# Patient Record
Sex: MALE | Race: WHITE
[De-identification: names, ages, dates, MRNs, and addresses within clinical notes are randomized per-mention and may not be internally consistent; named-entity substitution may affect disease eponyms.]

---

## 2020-09-15 ENCOUNTER — HOSPITAL ENCOUNTER (EMERGENCY)
Dept: HOSPITAL 50 - VM.ED | Age: 70
Discharge: HOME | End: 2020-09-15
Payer: OTHER GOVERNMENT

## 2020-09-15 DIAGNOSIS — Z88.8: ICD-10-CM

## 2020-09-15 DIAGNOSIS — Z79.82: ICD-10-CM

## 2020-09-15 DIAGNOSIS — Z88.5: ICD-10-CM

## 2020-09-15 DIAGNOSIS — F02.80: ICD-10-CM

## 2020-09-15 DIAGNOSIS — Z20.828: ICD-10-CM

## 2020-09-15 DIAGNOSIS — E86.0: ICD-10-CM

## 2020-09-15 DIAGNOSIS — G31.83: Primary | ICD-10-CM

## 2020-09-15 LAB
ANION GAP SERPL CALC-SCNC: 13.8 MMOL/L (ref 10–20)
CHLORIDE SERPL-SCNC: 101 MMOL/L (ref 98–107)
SODIUM SERPL-SCNC: 139 MMOL/L (ref 136–145)

## 2020-09-15 PROCEDURE — U0002 COVID-19 LAB TEST NON-CDC: HCPCS

## 2020-09-15 NOTE — CR
______________________________________________________________________________   

  

[id number] RAD/RAD Chest PA or AP 1V  

EXAM: FRONTAL CHEST  

   

 INDICATION: WHEEZING.  

   

 COMPARISON: None.  

   

 DISCUSSION: Possible underlying emphysema. No acute infiltrates. Borderline  

 heart size without evidence of heart failure.  

   

 IMPRESSION:    

 1.  No acute findings.  

   

 Electronically signed by Christian Wells MD on 9/15/2020 2:18 PM  

   

  

Christian Wells MD                 

 09/15/20 9106    

  

Thank you for allowing us to participate in the care of your patient.

## 2020-09-15 NOTE — EDM.PDOC
ED HPI GENERAL MEDICAL PROBLEM





- General


Chief Complaint: General


Stated Complaint: ER


Time Seen by Provider: 09/15/20 13:32


Source of Information: Reports: Family





- History of Present Illness


INITIAL COMMENTS - FREE TEXT/NARRATIVE: 





Norris is a 71 y/o male who is brought to the ER by his wife today for 

increased tremors and overall weakness. He had his home health visit today and 

they nurse thought he seemed to have tremors all over his body, which is unusual

for him. He has Lewy Body Dementia and had been cared for exclusively at home by

his wife with Home Health assistance. He didn't really want to get out of bed 

today and he seemed overall more weak. He had his bath today and the aide 

thought he seemed to be ill so his wife brought him to the ER.


[facility] [place]. He sees neurology 

in [place].








- Related Data


                                    Allergies











Allergy/AdvReac Type Severity Reaction Status Date / Time


 


gemfibrozil Allergy  Swelling Verified 09/15/20 13:34


 


morphine Allergy  Cannot Verified 09/15/20 13:34





   Remember  











Home Meds: 


                                    Home Meds





Acetaminophen [Tylenol] 325 mg PO ASDIRECTED 09/15/20 [History]


Albuterol Sulfate [Proair Digihaler] 90 mcg IH QID 09/15/20 [History]


Aspirin 81 mg PO DAILY 09/15/20 [History]


Budesonide/Formoterol Fumarate [Budesonide-Formoterol 160-4.5] 10.2 gm IH BID 

09/15/20 [History]


DULoxetine [Cymbalta] 60 mg PO DAILY 09/15/20 [History]


Donepezil HCl 10 mg PO BEDTIME 09/15/20 [History]


Finasteride 5 mg PO DAILY 09/15/20 [History]


Fluticasone Propionate [Flovent Diskus] 50 mcg IH DAILY 09/15/20 [History]


Meloxicam 15 mg PO DAILY 09/15/20 [History]


Metoprolol Tartrate [Lopressor] 100 mg PO BID 09/15/20 [History]


Multivitamin [Multivitamins] 1 cap PO DAILY 09/15/20 [History]


Simvastatin [Zocor] 80 mg PO BEDTIME 09/15/20 [History]


Tamsulosin [Flomax] 0.4 mg PO DAILY 09/15/20 [History]


hydroCHLOROthiazide [Hydrochlorothiazide] 25 mg PO DAILY 09/15/20 [History]











Past Medical History


Neurological History: Reports: Other (See Below) (Lewy Body Dementia)





ED ROS GENERAL





- Review of Systems


Review Of Systems: See Below





ED EXAM, GENERAL





- Physical Exam


Exam: See Below


General Appearance: Alert, WD/WN, No Apparent Distress (Elderly male)


Eye Exam: Bilateral Eye: PERRL


Ears: Normal External Exam, Normal Canal, Hearing Grossly Normal, Normal TMs


Nose: Normal Inspection, Normal Mucosa


Throat/Mouth: Normal Oropharynx, No Airway Compromise, Other (Lips slightly dry)


Head: Atraumatic, Normocephalic


Neck: Normal Inspection, Supple, Non-Tender


Respiratory/Chest: Wheezing (faint wheezing throughout)


Cardiovascular: Regular Rate, Rhythm, Systolic Murmur


GI/Abdominal: Normal Bowel Sounds, Soft, Non-Tender


 (Male) Exam: Deferred


Rectal (Males) Exam: Deferred


Back Exam: Normal Inspection


Extremities: Normal Inspection, Normal Capillary Refill


Neurological: Alert, CN II-XII Intact, Slow to Respond


Skin Exam: Warm, Dry, Intact, Normal Color


Lymphatic: No Adenopathy





Course





- Vital Signs


Text/Narrative:: 





1332 The patient was seen on arrival to the ER. Labs ordered.





1445 Labs reviewed. CBC neg, COVID neg, and note BUN=28. Crt normal, but patient

has not voided yet. Will given a liter of NS over a couple hours and hopefully 

be able to check a UA when he can void. NS 1000cc over 2 hours ordered. 


Patient also had mild wheezing and had not yet had his daily inhalers due to 

weakness, albuterol neb ordered.





1600 Breathing easier following neb, wheezing gone now. 





1700 Still not voided, second liter of NS ordered.





1810 Patient able to void after 1/2 second liter NS infused. No further 

excessive tremors and patient now more talkative and looks better. Conversation 

more coherent. Patient and his wife given discharge instructions and will send 

the patient home. 





He left the ER in stable condition.





Last Recorded V/S: 


                                Last Vital Signs











Temp  36.2 C   09/15/20 13:34


 


Pulse  53 L  09/15/20 16:46


 


Resp  12   09/15/20 16:46


 


BP  143/64 H  09/15/20 16:46


 


Pulse Ox  92 L  09/15/20 16:46














- Orders/Labs/Meds


Orders: 


                               Active Orders 24 hr











 Category Date Time Status


 


 RT Aerosol Therapy [RC] ASDIRECTED Care  09/15/20 14:54 Active


 


 Sodium Chloride 0.9% [Saline Flush] Med  09/15/20 13:40 Active





 10 ml FLUSH ASDIRECTED PRN   


 


 Saline Lock Insert [OM.PC] Stat Oth  09/15/20 13:40 Ordered








                                Medication Orders





Sodium Chloride (Saline Flush)  10 ml FLUSH ASDIRECTED PRN


   PRN Reason: Keep Vein Open








Labs: 


                                Laboratory Tests











  09/15/20 09/15/20 09/15/20 Range/Units





  13:50 14:08 14:08 


 


WBC   8.5   (4.0-10.0)  x10^3/uL


 


RBC   5.38   (4.5-6.0)  x10^6/uL


 


Hgb   16.5   (14.0-18.0)  g/dL


 


Hct   48.6   (40.0-52.0)  %


 


MCV   90.3   (78.0-93.0)  fL


 


MCH   30.7   (26.0-32.0)  pg


 


MCHC   34.0   (32.0-36.0)  g/dL


 


RDW Coeff of Michele   13.3   (10.0-15.0)  %


 


Plt Count   170   (130-400)  x10^3/uL


 


Neut % (Auto)   63.7   (50.0-80.0)  %


 


Lymph % (Auto)   22.9 L   (25.0-50.0)  %


 


Mono % (Auto)   10.0   (2.0-11.0)  %


 


Eos % (Auto)   3.0   (0.0-4.0)  %


 


Baso % (Auto)   0.4   (0.2-1.2)  %


 


Sodium    139  (136-145)  mmol/L


 


Potassium    3.8  (3.5-5.1)  mmol/L


 


Chloride    101  ()  mmol/L


 


Carbon Dioxide    28  (21-32)  mmol/L


 


Anion Gap    13.8  (10-20)  mmol/L


 


BUN    28 H  (7-18)  mg/dL


 


Creatinine    1.3  (0.70-1.30)  mg/dL


 


Est Cr Clr Drug Dosing    54.71  mL/min


 


Estimated GFR (MDRD)    54  


 


Glucose    132 H  ()  mg/dL


 


Calcium    9.3  (8.5-10.1)  mg/dL


 


Corrected Calcium    9.62  (8.5-10.1)  mg/dL


 


Total Bilirubin    0.6  (0.2-1.0)  mg/dL


 


AST    20  (15-37)  U/L


 


ALT    34  (16-63)  U/L


 


Alkaline Phosphatase    71  ()  U/L


 


Total Protein    7.5  (6.4-8.2)  g/dL


 


Albumin    3.6  (3.4-5.0)  g/dL


 


Globulin    3.9  


 


Albumin/Globulin Ratio    0.92  


 


Urine Color     (YELLOW)  


 


Urine Appearance     (CLEAR)  


 


Urine pH     (5.0-8.0)  


 


Ur Specific Gravity     


 


Urine Protein     (NEGATIVE)  mg/dL


 


Urine Glucose (UA)     (NEGATIVE)  mg/dL


 


Urine Ketones     (NEGATIVE)  mg/dL


 


Urine Occult Blood     (NEGATIVE)  


 


Urine Nitrite     (NEGATIVE)  


 


Urine Bilirubin     (NEGATIVE)  


 


Urine Urobilinogen     (0.2)  EU/dL


 


Ur Leukocyte Esterase     (NEGATIVE)  


 


SARS CoV-2 RNA Rapid ALEKS  Negative    (NEGATIVE)  














  09/15/20 Range/Units





  18:04 


 


WBC   (4.0-10.0)  x10^3/uL


 


RBC   (4.5-6.0)  x10^6/uL


 


Hgb   (14.0-18.0)  g/dL


 


Hct   (40.0-52.0)  %


 


MCV   (78.0-93.0)  fL


 


MCH   (26.0-32.0)  pg


 


MCHC   (32.0-36.0)  g/dL


 


RDW Coeff of Michele   (10.0-15.0)  %


 


Plt Count   (130-400)  x10^3/uL


 


Neut % (Auto)   (50.0-80.0)  %


 


Lymph % (Auto)   (25.0-50.0)  %


 


Mono % (Auto)   (2.0-11.0)  %


 


Eos % (Auto)   (0.0-4.0)  %


 


Baso % (Auto)   (0.2-1.2)  %


 


Sodium   (136-145)  mmol/L


 


Potassium   (3.5-5.1)  mmol/L


 


Chloride   ()  mmol/L


 


Carbon Dioxide   (21-32)  mmol/L


 


Anion Gap   (10-20)  mmol/L


 


BUN   (7-18)  mg/dL


 


Creatinine   (0.70-1.30)  mg/dL


 


Est Cr Clr Drug Dosing   mL/min


 


Estimated GFR (MDRD)   


 


Glucose   ()  mg/dL


 


Calcium   (8.5-10.1)  mg/dL


 


Corrected Calcium   (8.5-10.1)  mg/dL


 


Total Bilirubin   (0.2-1.0)  mg/dL


 


AST   (15-37)  U/L


 


ALT   (16-63)  U/L


 


Alkaline Phosphatase   ()  U/L


 


Total Protein   (6.4-8.2)  g/dL


 


Albumin   (3.4-5.0)  g/dL


 


Globulin   


 


Albumin/Globulin Ratio   


 


Urine Color  Dark yellow H  (YELLOW)  


 


Urine Appearance  Slightly cloudy H  (CLEAR)  


 


Urine pH  5.5  (5.0-8.0)  


 


Ur Specific Gravity  >=1.030  


 


Urine Protein  Negative  (NEGATIVE)  mg/dL


 


Urine Glucose (UA)  Negative  (NEGATIVE)  mg/dL


 


Urine Ketones  Negative  (NEGATIVE)  mg/dL


 


Urine Occult Blood  Negative  (NEGATIVE)  


 


Urine Nitrite  Negative  (NEGATIVE)  


 


Urine Bilirubin  Negative  (NEGATIVE)  


 


Urine Urobilinogen  0.2  (0.2)  EU/dL


 


Ur Leukocyte Esterase  Negative  (NEGATIVE)  


 


SARS CoV-2 RNA Rapid ALEKS   (NEGATIVE)  











Meds: 


Medications











Generic Name Dose Route Start Last Admin





  Trade Name Freq  PRN Reason Stop Dose Admin


 


Sodium Chloride  10 ml  09/15/20 13:40 





  Saline Flush  FLUSH  





  ASDIRECTED PRN  





  Keep Vein Open  














Discontinued Medications














Generic Name Dose Route Start Last Admin





  Trade Name Freq  PRN Reason Stop Dose Admin


 


Albuterol  2.5 mg  09/15/20 14:54  09/15/20 15:05





  Proventil Neb Soln  NEB  09/15/20 14:55  2.5 mg





  ONETIME ONE   Administration


 


Sodium Chloride  1,000 mls @ 500 mls/hr  09/15/20 14:52  09/15/20 15:01





  Normal Saline  IV  09/15/20 16:51  500 mls/hr





  ONETIME ONE   Administration














- Radiology Interpretation


Free Text/Narrative:: 





XR Chest 1V=no acute findings





Departure





- Departure


Time of Disposition: 18:20


Disposition: Home, Self-Care 01


Condition: Good


Clinical Impression: 


 Dehydration, Lewy body dementia








- Discharge Information


*PRESCRIPTION DRUG MONITORING PROGRAM REVIEWED*: Not Applicable


*COPY OF PRESCRIPTION DRUG MONITORING REPORT IN PATIENT JESSY: Not Applicable


Instructions:  Dehydration, Adult, Easy-to-Read, Lewy Body Dementia


Referrals: 


PCP,None [Primary Care Provider] - 


Forms:  ED Department Discharge





Sepsis Event Note (ED)





- Evaluation


Sepsis Screening Result: No Definite Risk





- Focused Exam


Vital Signs: 


                                   Vital Signs











  Temp Pulse Resp BP Pulse Ox


 


 09/15/20 16:46   53 L  12  143/64 H  92 L


 


 09/15/20 14:49   55 L  21 H  136/69  92 L


 


 09/15/20 13:34  36.2 C  53 L  24 H  148/54 H  94 L














- My Orders


Last 24 Hours: 


My Active Orders





09/15/20 13:40


Sodium Chloride 0.9% [Saline Flush]   10 ml FLUSH ASDIRECTED PRN 


Saline Lock Insert [OM.PC] Stat 





09/15/20 14:54


RT Aerosol Therapy [RC] ASDIRECTED 














- Assessment/Plan


Last 24 Hours: 


My Active Orders





09/15/20 13:40


Sodium Chloride 0.9% [Saline Flush]   10 ml FLUSH ASDIRECTED PRN 


Saline Lock Insert [OM.PC] Stat 





09/15/20 14:54


RT Aerosol Therapy [RC] ASDIRECTED 











Assessment:: 





1)Dehydration


2)Hx Lewy Body Dementia





Plan: 





-Resume home medications as prescribed by PCP at the [facility]


-Continue all cares at home as previously ordered


-Push oral fluids


-Keep your upcoming Neurology appt in 2 weeks or see PCP sooner if any concerns


-Return to the ER if any other concerns or new symptoms

## 2020-12-10 ENCOUNTER — HOSPITAL ENCOUNTER (EMERGENCY)
Dept: HOSPITAL 50 - VM.ED | Age: 70
LOS: 1 days | Discharge: ANOTHER HOSPITAL | End: 2020-12-11
Payer: OTHER GOVERNMENT

## 2020-12-10 DIAGNOSIS — R79.89: ICD-10-CM

## 2020-12-10 DIAGNOSIS — Z79.899: ICD-10-CM

## 2020-12-10 DIAGNOSIS — Z88.5: ICD-10-CM

## 2020-12-10 DIAGNOSIS — Z79.82: ICD-10-CM

## 2020-12-10 DIAGNOSIS — J44.1: ICD-10-CM

## 2020-12-10 DIAGNOSIS — F02.80: ICD-10-CM

## 2020-12-10 DIAGNOSIS — U07.1: Primary | ICD-10-CM

## 2020-12-10 DIAGNOSIS — R09.02: ICD-10-CM

## 2020-12-10 DIAGNOSIS — Z88.8: ICD-10-CM

## 2020-12-10 DIAGNOSIS — G31.83: ICD-10-CM

## 2020-12-10 LAB
CHLORIDE SERPL-SCNC: 101 MMOL/L (ref 98–107)
SODIUM SERPL-SCNC: 138 MMOL/L (ref 136–145)

## 2020-12-10 PROCEDURE — 82728 ASSAY OF FERRITIN: CPT

## 2020-12-10 PROCEDURE — 87635 SARS-COV-2 COVID-19 AMP PRB: CPT

## 2020-12-10 PROCEDURE — 83880 ASSAY OF NATRIURETIC PEPTIDE: CPT

## 2020-12-10 PROCEDURE — 86140 C-REACTIVE PROTEIN: CPT

## 2020-12-10 PROCEDURE — 84145 PROCALCITONIN (PCT): CPT

## 2020-12-10 PROCEDURE — 93005 ELECTROCARDIOGRAM TRACING: CPT

## 2020-12-10 PROCEDURE — 85379 FIBRIN DEGRADATION QUANT: CPT

## 2020-12-10 PROCEDURE — 84484 ASSAY OF TROPONIN QUANT: CPT

## 2020-12-10 PROCEDURE — 96365 THER/PROPH/DIAG IV INF INIT: CPT

## 2020-12-10 PROCEDURE — 80053 COMPREHEN METABOLIC PANEL: CPT

## 2020-12-10 PROCEDURE — 87040 BLOOD CULTURE FOR BACTERIA: CPT

## 2020-12-10 PROCEDURE — 83615 LACTATE (LD) (LDH) ENZYME: CPT

## 2020-12-10 PROCEDURE — U0002 COVID-19 LAB TEST NON-CDC: HCPCS

## 2020-12-10 PROCEDURE — 71045 X-RAY EXAM CHEST 1 VIEW: CPT

## 2020-12-10 PROCEDURE — 36415 COLL VENOUS BLD VENIPUNCTURE: CPT

## 2020-12-10 PROCEDURE — 96375 TX/PRO/DX INJ NEW DRUG ADDON: CPT

## 2020-12-10 PROCEDURE — 85025 COMPLETE CBC W/AUTO DIFF WBC: CPT

## 2020-12-10 PROCEDURE — 83605 ASSAY OF LACTIC ACID: CPT

## 2020-12-10 PROCEDURE — 99285 EMERGENCY DEPT VISIT HI MDM: CPT

## 2020-12-10 NOTE — EDM.PDOC
ED HPI GENERAL MEDICAL PROBLEM





- General


Chief Complaint: Respiratory Problem


Stated Complaint: Increased SOB, lethargy, hypoxia


Time Seen by Provider: 12/10/20 21:45


Source of Information: Reports: Patient, Family


History Limitations: Reports: Altered Mental Status





- History of Present Illness


INITIAL COMMENTS - FREE TEXT/NARRATIVE: 





Patient was brought to the emergency department today by his daughter who is 

actually a nurse in the emergency department.  This patient has a history of 

Lewy bodies dementia with Parkinson's.  He was exposed to a grandson was Covid 

positive about a week ago.  For the past about 4 to 5 days the primary caregiver

of the wife of this patient has noticed that he has not been self.  He does have

a history of COPD.  She notes that he has a little bit more shortness of breath 

coughing wheezing.  He has not been as active.  He has not been drinking as much

fluids or eating much for food.  Most of the HPI is obtained from the daughter 

who is also primary caregiver and the wife on the following due to the patient's

dementia.  They noted at home today with oxygen saturation about 86% on room 

air.  He is not typically on the oxygen for his limitation at home.  He has been

using for more of his nebulizers without much improvement of his shortness of 

breath.  He identified a fever today of 101.2.  With the recent Covid exposure 

and his worsening status he was brought to the emergency department for further 

evaluation.  He has not been vomiting.  He has no diarrhea.  The patient does 

not offer any complaints at this time.





- Related Data


                                    Allergies











Allergy/AdvReac Type Severity Reaction Status Date / Time


 


gemfibrozil Allergy  Swelling Verified 12/10/20 23:38


 


morphine Allergy  Cannot Verified 12/10/20 23:38





   Remember  











Home Meds: 


                                    Home Meds





Acetaminophen [Tylenol] 325 mg PO ASDIRECTED 09/15/20 [History]


Albuterol Sulfate [Proair Digihaler] 90 mcg IH QID 09/15/20 [History]


Aspirin 81 mg PO DAILY 09/15/20 [History]


Budesonide/Formoterol Fumarate [Budesonide-Formoterol 160-4.5] 10.2 gm IH BID 

09/15/20 [History]


DULoxetine [Cymbalta] 60 mg PO DAILY 09/15/20 [History]


Donepezil HCl 10 mg PO BEDTIME 09/15/20 [History]


Finasteride 5 mg PO DAILY 09/15/20 [History]


Fluticasone Propionate [Flovent Diskus] 50 mcg IH DAILY 09/15/20 [History]


Meloxicam 15 mg PO DAILY 09/15/20 [History]


Metoprolol Tartrate [Lopressor] 100 mg PO BID 09/15/20 [History]


Multivitamin [Multivitamins] 1 cap PO DAILY 09/15/20 [History]


Simvastatin [Zocor] 80 mg PO BEDTIME 09/15/20 [History]


Tamsulosin [Flomax] 0.4 mg PO DAILY 09/15/20 [History]


hydroCHLOROthiazide [Hydrochlorothiazide] 25 mg PO DAILY 09/15/20 [History]











Past Medical History


Neurological History: Reports: Other (See Below) (Lewy Body Dementia)


Other Neuro History: Lewy Body Dementia





- Infectious Disease History


Infectious Disease History: Reports: Novel Coronavirus





ED ROS GENERAL





- Review of Systems


Review Of Systems: Unable To Obtain


Reason Not Obtained: Patient with dementia.





ED EXAM, GENERAL





- Physical Exam


Exam: See Below


Free Text/Narrative:: 





He is alert and interactive.  He is mildly tachypneic and mildly short of breath

 on exam.  He is able to speak about 3-5 word sentences.  He has some audible 

wheezing.  He gets very short of breath with any physical exertion.  There is no

 severe distress.  No labored breathing.


Exam Limited By: Altered Mental Status


General Appearance: Alert, WD/WN


Eye Exam: Bilateral Eye: EOMI, PERRL


Ears: Normal External Exam


Nose: Normal Inspection


Throat/Mouth: Normal Inspection


Head: Atraumatic, Normocephalic


Neck: Normal Inspection, Supple


Respiratory/Chest: No Respiratory Distress, Decreased Breath Sounds, Wheezing 

(Inspiratory expiratory wheezing bilaterally.).  No: Crackles, Rales, Rhonchi, 

Stridor, Accessory Muscle Use, Retractions


Cardiovascular: Normal Peripheral Pulses, Regular Rate, Rhythm


Peripheral Pulses: 2+: Radial (L), Radial (R), Posterior Tibial (L), Posterior 

Tibial (R), Dorsalis Pedis (L), Dorsalis Pedis (R)


GI/Abdominal: Normal Bowel Sounds, Soft, Non-Tender, Pelvis Stable


 (Male) Exam: Deferred


Rectal (Males) Exam: Deferred


Back Exam: Normal Inspection, Full Range of Motion


Extremities: Normal Inspection, Normal Range of Motion, Pedal Edema (1+ 

minimally pigmented edema bilaterally around the ankles)


Neurological: Alert, Normal Gait, No Motor/Sensory Deficits, Confused (Although 

this is his baseline)


Psychiatric: Flat Affect


Skin Exam: Intact, Cool, Diaphoretic, Pallor


Lymphatic: No Adenopathy


  ** #1 Interpretation


EKG Date: 12/10/20


Time: 22:45


Rhythm: NSR


Rate (Beats/Min): 84


Axis: Normal


P-Wave: Present


QRS: Other (Intraventricular conduction delay)


ST-T: Normal


QT: Normal





Course





- Vital Signs


Last Recorded V/S: 


                                Last Vital Signs











Temp  95.4 F L  12/10/20 21:43


 


Pulse  81   12/10/20 23:58


 


Resp  24 H  12/10/20 23:58


 


BP  124/80   12/10/20 23:58


 


Pulse Ox  94 L  12/10/20 23:58














- Orders/Labs/Meds


Labs: 


                                Laboratory Tests











  12/10/20 12/10/20 12/10/20 Range/Units





  21:50 22:25 22:25 


 


WBC   4.5   (4.0-10.0)  x10^3/uL


 


RBC   5.07   (4.5-6.0)  x10^6/uL


 


Hgb   15.4   (14.0-18.0)  g/dL


 


Hct   45.8   (40.0-52.0)  %


 


MCV   90.3   (78.0-93.0)  fL


 


MCH   30.4   (26.0-32.0)  pg


 


MCHC   33.6   (32.0-36.0)  g/dL


 


RDW Coeff of Michele   14.3   (10.0-15.0)  %


 


Plt Count   105 L   (130-400)  x10^3/uL


 


Neut % (Auto)   54.6   (50.0-80.0)  %


 


Lymph % (Auto)   28.9   (25.0-50.0)  %


 


Mono % (Auto)   15.6 H   (2.0-11.0)  %


 


Eos % (Auto)   0.7   (0.0-4.0)  %


 


Baso % (Auto)   0.2   (0.2-1.2)  %


 


D-Dimer, Quantitative     (<=0.58)  mg/LFEU


 


Sodium    138  (136-145)  mmol/L


 


Potassium    3.9  (3.5-5.1)  mmol/L


 


Chloride    101  ()  mmol/L


 


Carbon Dioxide    29  (21-32)  mmol/L


 


Anion Gap    11.9  (10-20)  mmol/L


 


BUN    18  (7-18)  mg/dL


 


Creatinine    1.3  (0.70-1.30)  mg/dL


 


Est Cr Clr Drug Dosing    TNP  


 


Estimated GFR (MDRD)    54  


 


Glucose    119 H  ()  mg/dL


 


Lactic Acid     (0.4-2.0)  mmol/L


 


Calcium    8.2 L  (8.5-10.1)  mg/dL


 


Corrected Calcium    8.60  (8.5-10.1)  mg/dL


 


Ferritin     ()  ng/mL


 


Total Bilirubin    0.6  (0.2-1.0)  mg/dL


 


AST    30  (15-37)  U/L


 


ALT    42  (16-63)  U/L


 


Alkaline Phosphatase    72  ()  U/L


 


Lactate Dehydrogenase     ()  U/L


 


Troponin I    0.485 H*  (<=0.056)  ng/mL


 


C-Reactive Protein    2.2 H  (<=0.9)  mg/dL


 


NT-Pro-B Natriuret Pep    456 H  (<=125)  pg/mL


 


Total Protein    7.6  (6.4-8.2)  g/dL


 


Albumin    3.5  (3.4-5.0)  g/dL


 


Globulin    4.1  


 


Albumin/Globulin Ratio    0.85  


 


Procalcitonin     ng/mL


 


SARS CoV-2 RNA Rapid ALEKS  Positive H    (NEGATIVE)  














  12/10/20 12/10/20 12/10/20 Range/Units





  22:25 22:25 22:25 


 


WBC     (4.0-10.0)  x10^3/uL


 


RBC     (4.5-6.0)  x10^6/uL


 


Hgb     (14.0-18.0)  g/dL


 


Hct     (40.0-52.0)  %


 


MCV     (78.0-93.0)  fL


 


MCH     (26.0-32.0)  pg


 


MCHC     (32.0-36.0)  g/dL


 


RDW Coeff of Michele     (10.0-15.0)  %


 


Plt Count     (130-400)  x10^3/uL


 


Neut % (Auto)     (50.0-80.0)  %


 


Lymph % (Auto)     (25.0-50.0)  %


 


Mono % (Auto)     (2.0-11.0)  %


 


Eos % (Auto)     (0.0-4.0)  %


 


Baso % (Auto)     (0.2-1.2)  %


 


D-Dimer, Quantitative   0.39   (<=0.58)  mg/LFEU


 


Sodium     (136-145)  mmol/L


 


Potassium     (3.5-5.1)  mmol/L


 


Chloride     ()  mmol/L


 


Carbon Dioxide     (21-32)  mmol/L


 


Anion Gap     (10-20)  mmol/L


 


BUN     (7-18)  mg/dL


 


Creatinine     (0.70-1.30)  mg/dL


 


Est Cr Clr Drug Dosing     


 


Estimated GFR (MDRD)     


 


Glucose     ()  mg/dL


 


Lactic Acid  1.2    (0.4-2.0)  mmol/L


 


Calcium     (8.5-10.1)  mg/dL


 


Corrected Calcium     (8.5-10.1)  mg/dL


 


Ferritin     ()  ng/mL


 


Total Bilirubin     (0.2-1.0)  mg/dL


 


AST     (15-37)  U/L


 


ALT     (16-63)  U/L


 


Alkaline Phosphatase     ()  U/L


 


Lactate Dehydrogenase    181  ()  U/L


 


Troponin I     (<=0.056)  ng/mL


 


C-Reactive Protein     (<=0.9)  mg/dL


 


NT-Pro-B Natriuret Pep     (<=125)  pg/mL


 


Total Protein     (6.4-8.2)  g/dL


 


Albumin     (3.4-5.0)  g/dL


 


Globulin     


 


Albumin/Globulin Ratio     


 


Procalcitonin     ng/mL


 


SARS CoV-2 RNA Rapid ALEKS     (NEGATIVE)  














  12/10/20 12/10/20 Range/Units





  22:25 22:25 


 


WBC    (4.0-10.0)  x10^3/uL


 


RBC    (4.5-6.0)  x10^6/uL


 


Hgb    (14.0-18.0)  g/dL


 


Hct    (40.0-52.0)  %


 


MCV    (78.0-93.0)  fL


 


MCH    (26.0-32.0)  pg


 


MCHC    (32.0-36.0)  g/dL


 


RDW Coeff of Michele    (10.0-15.0)  %


 


Plt Count    (130-400)  x10^3/uL


 


Neut % (Auto)    (50.0-80.0)  %


 


Lymph % (Auto)    (25.0-50.0)  %


 


Mono % (Auto)    (2.0-11.0)  %


 


Eos % (Auto)    (0.0-4.0)  %


 


Baso % (Auto)    (0.2-1.2)  %


 


D-Dimer, Quantitative    (<=0.58)  mg/LFEU


 


Sodium    (136-145)  mmol/L


 


Potassium    (3.5-5.1)  mmol/L


 


Chloride    ()  mmol/L


 


Carbon Dioxide    (21-32)  mmol/L


 


Anion Gap    (10-20)  mmol/L


 


BUN    (7-18)  mg/dL


 


Creatinine    (0.70-1.30)  mg/dL


 


Est Cr Clr Drug Dosing    


 


Estimated GFR (MDRD)    


 


Glucose    ()  mg/dL


 


Lactic Acid    (0.4-2.0)  mmol/L


 


Calcium    (8.5-10.1)  mg/dL


 


Corrected Calcium    (8.5-10.1)  mg/dL


 


Ferritin  534 H   ()  ng/mL


 


Total Bilirubin    (0.2-1.0)  mg/dL


 


AST    (15-37)  U/L


 


ALT    (16-63)  U/L


 


Alkaline Phosphatase    ()  U/L


 


Lactate Dehydrogenase    ()  U/L


 


Troponin I    (<=0.056)  ng/mL


 


C-Reactive Protein    (<=0.9)  mg/dL


 


NT-Pro-B Natriuret Pep    (<=125)  pg/mL


 


Total Protein    (6.4-8.2)  g/dL


 


Albumin    (3.4-5.0)  g/dL


 


Globulin    


 


Albumin/Globulin Ratio    


 


Procalcitonin   0.08  ng/mL


 


SARS CoV-2 RNA Rapid ALEKS    (NEGATIVE)  











Meds: 


Medications














Discontinued Medications














Generic Name Dose Route Start Last Admin





  Trade Name Freq  PRN Reason Stop Dose Admin


 


Albuterol  2.5 mg  12/10/20 22:09  12/10/20 22:27





  Proventil Neb Soln  [facility]  12/10/20 22:10  2.5 mg





  STAT STA   Administration


 


Albuterol/Ipratropium  3 ml  12/10/20 22:03 





  Duoneb 3.0-0.5 Mg/3 Ml  [facility]  12/10/20 22:04 





  ONETIME ONE  


 


Aspirin  324 mg  12/11/20 00:09  12/11/20 00:13





  Aspirin  PO  12/11/20 00:10  324 mg





  ONETIME ONE   Administration


 


Azithromycin  500 mg  12/10/20 22:03  12/10/20 22:38





  Zithromax  PO  12/10/20 22:04  500 mg





  ONETIME ONE   Administration


 


Ceftriaxone Sodium  1 gm  12/10/20 22:03  12/10/20 22:45





  Rocephin  IVPUSH  12/10/20 22:04  1 gm





  STAT ONE   Administration


 


Lactated Ringer's  1,000 mls @ 125 mls/hr  12/11/20 00:07  12/11/20 00:51





  Ringers, Lactated  IV  12/11/20 08:06  125 mls/hr





  ONETIME ONE   Administration


 


Remdesivir 200 mg/ Sodium  250 mls @ 250 mls/hr  12/11/20 00:07  12/11/20 00:51





  Chloride  IV  12/11/20 01:06  250 mls/hr





  ONETIME ONE   Administration


 


Lorazepam  1 mg  12/10/20 22:14  12/10/20 22:40





  Ativan  IVPUSH  12/10/20 22:15  0.5 mg





  STAT ONE   Administration


 


Methylprednisolone Sodium Succinate  125 mg  12/10/20 22:03  12/10/20 22:30





  Solu-Medrol  IVPUSH  12/10/20 22:04  125 mg





  ONETIME ONE   Administration


 


Sodium Chloride  10 ml  12/10/20 21:44 





  Saline Flush  FLUSH  





  ASDIRECTED PRN  





  Keep Vein Open  














- Radiology Interpretation


Free Text/Narrative:: 





Chest x-ray per radiology shows no significant abnormalities.





- Re-Assessments/Exams


Free Text/Narrative Re-Assessment/Exam: 





This patient is requiring almost 4 L of oxygen to keep his oxygen saturation 

above 92%.





He was given an albuterol nebulizer with improvement of his shortness of breath.

  His inspiratory wheezing has resolved his expiratory has improved but is still

 present.  He is able to speak in full sentences at this time.





IV was established labs are drawn.





Influenza negative.





Covid positive.





Blood cultures x2.





With the history of his COPD and now possible exacerbation due to his Covid 

status, procalcitonin is pending and I did administer 1 g of Rocephin IV push 

and 5 mg of the azithromycin p.o.  He is also given 125 mg of Solu-Medrol IV 

push.





Laboratory evaluation shows a white blood cell count of 4.5, hemoglobin 15.4, 

platelet 105.





D-dimer not elevated at 0.39.





Sodium 138 potassium 3.9 BUN 18 creatinine 1.3, glucose 119, lactic acid 1.2, 

calcium 8.2,





Ferritin 534 with a lactate dehydrogenase of 181.





C-reactive protein minimally elevated at 2.2.


 troponin 0 0.45.  The patient does not actively have any chest pain.  He does 

not have an elevated D-dimer concerning for a PE.  His EKG is on remarkable from

 his previous.  He was given 324 of oral chewable aspirin.  This is most likely 

due to cardiac strain due to Covid status although with his multiple 

comorbidities this could be other pathology as well.





This patient is high risk with Covid with his comorbidities and age.  I talked 

with the patient and his daughter as well as the wife about the administration 

of remdesivir.  The EUA for remdesivir was discussed with them at length as well

 although this is not a requirement anymore I still discussed that this is very 

new medication with the left of unknown as to it.  Although this is best course 

of action at this time with this patient who is elderly and comorbid status.  

The family to include the daughter and the mother's questions were answered they

 were comfortable with this plan we will administered remdesivir per protocol.





Due to his elevation of troponin he is not an [facility]  I called and spoke with Dr. Grant at [facility] due to 

the multiple issues today.  HPI ER course findings and concerns were relayed to 

Dr. Grant. He was comfortable w ith the care of this patient at this time and 

no new guidance or recommendation was made.  He excepted this patient in 

transfer to the Covid unit at [facility].  





I discussed the plan of care with the patient his daughter and his wife my 

concerns will be elevated troponin although this is most likely due to cardiac 

strain due to the hypoxia or the Covid status.  We will send him to [facility] in 

[place] for further care evaluation.  They are comfortable with this plan and her 

questions were answered.








Departure





- Departure


Time of Disposition: 00:31


Disposition: DC/Tfer to Acute Hospital [facility]


Clinical Impression: 


 Elevated troponin, COVID-19, Hypoxia, COPD with exacerbation








- Discharge Information


Referrals: 


PCP,Unknown [Primary Care Provider] - 


Forms:  ED Department Discharge, Interfacility Transfer [facility]Y

## 2020-12-11 LAB — ANION GAP SERPL CALC-SCNC: 11.9 MMOL/L (ref 10–20)

## 2020-12-11 NOTE — CR
______________________________________________________________________________   

  

[id number] RAD/RAD Chest PA or AP 1V  

EXAM:  

   

 SINGLE VIEW CHEST.  

   

 INDICATION:  

   

 VIRAL INFECTION  

   

 HYPOXIA  

   

 COMPARISON:  

   

 CORRELATION IS MADE WITH SEPTEMBER 15, 2020  

   

 FINDINGS:  

   

 The lungs are clear  

   

 The cardiomediastinal contour is stable  

   

 IMPRESSION:  

   

 NO PNEUMONIA.  

   

 Electronically signed by Avery Riddle MD on 12/11/2020 7:45 AM  

   

  

Avery Riddle MD                 

 12/11/20 1025    

  

Thank you for allowing us to participate in the care of your patient.

## 2020-12-25 ENCOUNTER — HOSPITAL ENCOUNTER (INPATIENT)
Dept: HOSPITAL 50 - VM.MS | Age: 70
LOS: 1 days | DRG: 951 | End: 2020-12-26
Attending: INTERNAL MEDICINE | Admitting: INTERNAL MEDICINE
Payer: OTHER GOVERNMENT

## 2020-12-25 ENCOUNTER — HOSPITAL ENCOUNTER (EMERGENCY)
Dept: HOSPITAL 50 - VM.ED | Age: 70
Discharge: ANOTHER HOSPITAL | End: 2020-12-25
Payer: OTHER GOVERNMENT

## 2020-12-25 DIAGNOSIS — Z88.8: ICD-10-CM

## 2020-12-25 DIAGNOSIS — I71.2: ICD-10-CM

## 2020-12-25 DIAGNOSIS — J44.9: ICD-10-CM

## 2020-12-25 DIAGNOSIS — K66.8: ICD-10-CM

## 2020-12-25 DIAGNOSIS — E78.5: ICD-10-CM

## 2020-12-25 DIAGNOSIS — Z88.5: ICD-10-CM

## 2020-12-25 DIAGNOSIS — K66.8: Primary | ICD-10-CM

## 2020-12-25 DIAGNOSIS — I10: ICD-10-CM

## 2020-12-25 DIAGNOSIS — M51.36: ICD-10-CM

## 2020-12-25 DIAGNOSIS — N40.0: ICD-10-CM

## 2020-12-25 DIAGNOSIS — I35.0: ICD-10-CM

## 2020-12-25 DIAGNOSIS — I21.4: ICD-10-CM

## 2020-12-25 DIAGNOSIS — Z79.52: ICD-10-CM

## 2020-12-25 DIAGNOSIS — Z79.82: ICD-10-CM

## 2020-12-25 DIAGNOSIS — G31.83: ICD-10-CM

## 2020-12-25 DIAGNOSIS — Z86.19: ICD-10-CM

## 2020-12-25 DIAGNOSIS — F02.80: ICD-10-CM

## 2020-12-25 DIAGNOSIS — E66.9: ICD-10-CM

## 2020-12-25 DIAGNOSIS — N42.9: ICD-10-CM

## 2020-12-25 DIAGNOSIS — E78.00: ICD-10-CM

## 2020-12-25 DIAGNOSIS — G89.29: ICD-10-CM

## 2020-12-25 DIAGNOSIS — Z79.899: ICD-10-CM

## 2020-12-25 DIAGNOSIS — K63.1: ICD-10-CM

## 2020-12-25 DIAGNOSIS — Z51.5: Primary | ICD-10-CM

## 2020-12-25 LAB
ANION GAP SERPL CALC-SCNC: 15 MMOL/L (ref 10–20)
CHLORIDE SERPL-SCNC: 100 MMOL/L (ref 98–107)
SODIUM SERPL-SCNC: 139 MMOL/L (ref 136–145)

## 2020-12-25 RX ADMIN — SODIUM CHLORIDE PRN MG: 9 INJECTION, SOLUTION INTRAVENOUS at 18:42

## 2020-12-25 NOTE — CT
______________________________________________________________________________   

  

[id number] CT/CT Abdomen Pelvis W IV  

EXAM: CT Abdomen Pelvis W IV  

   

 CLINICAL DATA: ABDOMEN PAIN, DISTENSION, ?BOWEL OBSTRUCTION  

   

 COMPARISON STUDY: None.  

   

 FINDINGS:  

   

 Dependent atelectasis at the lung bases bilaterally.   

   

 There is free air noted within the abdomen.  

   

 The liver, spleen, pancreas, adrenal glands, gallbladder and kidneys are grossly  

 unremarkable. Bilateral simple appearing renal cysts.  

   

 No bowel obstruction or inflammation.   

   

 Small amount of free fluid within the right paracolic gutter.  

   

 No lymphadenopathy.  

   

 Vascular calcifications. 3.1 cm infrarenal abdominal aortic aneurysm.  

   

 Scattered changes of spondylosis the spine. No fracture or osseous lesion.  

   

 IMPRESSION:  

   

   

 1. Moderate amount of free intraperitoneal air. In the absence of recent surgery  

 or intraperitoneal procedure, findings are concerning for perforation. The  

 source of the perforation is not well identified. Small amount of fluid within  

 the right paracolic gutter.  

   

 Electronically signed by Papito Estrada MD on 12/25/2020 9:33 AM  

   

  

Papito Estrada DO                 

 12/25/20 0935    

  

Thank you for allowing us to participate in the care of your patient.

## 2020-12-25 NOTE — EDM.PDOC
ED HPI GENERAL MEDICAL PROBLEM





- General


Chief Complaint: Abdominal Pain


Stated Complaint: abd pain


Time Seen by Provider: 12/25/20 03:05


Source of Information: Reports: Patient, EMS, Family


History Limitations: Reports: No Limitations





- History of Present Illness


INITIAL COMMENTS - FREE TEXT/NARRATIVE: 





Patient comes emergency department today by ambulance from the nursing home with

complaints of abdominal pain.  As of note I had seen this patient about 2 weeks 

ago in the emergency department where he was diagnosed with COVID-19 acutely.  

He is outside of his quarantine time and is in the nursing home for rehab 

following his inpatient stay in [facility] where he received remdesivir and treatment

for his COVID-19.  He has been doing well at the nursing home he has been there 

for just a couple of days.  He has been eating and drinking appropriately.  He 

has been having normal bowel movements.  Tonight he began to complain of 

abdominal pain distention and bloating.  No nausea or vomiting.  The pain 

started shortly after dinner.  He has no chest pain or shortness of breath or 

difficulty breathing.  He does have a longstanding history of COPD is 

chronically has some shortness of breath but he is at his baseline.  Chronically

is diaphoretic as well and no more diaphoretic than typical.  Denies any 

hematuria dysuria or urinary frequency.  No black or tarry stools.  Has had a 

couple of bowel movements without diarrhea.  Does have a history of Lewy bodies 

dementia as well.  He did receive 100 mcg of fentanyl for pain management prior 

to coming to the emergency department due to his abdominal pain and his 

inability to sit down with this.


  ** Right Lower Abdomen


Pain Score (Numeric/FACES): 9





- Related Data


                                    Allergies











Allergy/AdvReac Type Severity Reaction Status Date / Time


 


gemfibrozil Allergy  Swelling Verified 12/25/20 04:20


 


morphine Allergy  Cannot Verified 12/25/20 04:20





   Remember  











Home Meds: 


                                    Home Meds





Acetaminophen [Tylenol] 650 mg PO ASDIRECTED 09/15/20 [History]


Albuterol Sulfate [Proair Digihaler] 2 puff IH QID 09/15/20 [History]


Aspirin 81 mg PO DAILY 09/15/20 [History]


Budesonide/Formoterol Fumarate [Budesonide-Formoterol 160-4.5] 2 puff IH BID 

09/15/20 [History]


DULoxetine [Cymbalta] 90 mg PO DAILY 09/15/20 [History]


Donepezil HCl 10 mg PO BEDTIME 09/15/20 [History]


Finasteride 5 mg PO DAILY 09/15/20 [History]


Meloxicam 15 mg PO DAILY 09/15/20 [History]


Metoprolol Tartrate [Lopressor] 100 mg PO BID 09/15/20 [History]


Multivitamin [Multivitamins] 1 cap PO DAILY 09/15/20 [History]


Tamsulosin [Flomax] 0.4 mg PO DAILY 09/15/20 [History]


hydroCHLOROthiazide [Hydrochlorothiazide] 25 mg PO DAILY 09/15/20 [History]


Ascorbic Acid [C-1000] 1,000 mg PO BID 12/25/20 [History]


Carbidopa/Levodopa [Carbidopa-Levodopa  Tab] 1 each PO TID 12/25/20 

[History]


Cholecalciferol (Vitamin D3) [Cholecalciferol] 1,000 unit PO DAILY 12/25/20 

[History]


Fluticasone Propionate [Flonase] 1 spray NASBOTH DAILY 12/25/20 [History]


QUEtiapine [SEROquel] 12.5 mg PO BID PRN 12/25/20 [History]


Rosuvastatin [Crestor] 10 mg PO DAILY 12/25/20 [History]


predniSONE [Prednisone] 40 mg PO DAILY 12/25/20 [History]











Past Medical History


Cardiovascular History: Reports: High Cholesterol, Hypertension


Respiratory History: Reports: COPD


Genitourinary History: Reports: Prostate Disorder


Neurological History: Reports: Other (See Below)


Other Neuro History: Lewy Body Dementia





- Infectious Disease History


Infectious Disease History: Reports: Novel Coronavirus





ED ROS GENERAL





- Review of Systems


Review Of Systems: Comprehensive ROS is negative, except as noted in HPI.





ED EXAM, GI/ABD





- Physical Exam


Exam: See Below


Exam Limited By: No Limitations


General Appearance: Alert, WD/WN, Mild Distress (He does appear mildly 

uncomfortable in pain.  He did receive 100 mcg of fentanyl due to his severe 

abdominal pain that he was unable to sit on the cot with.)


Eyes: Bilateral: EOMI


Ears: Normal External Exam, Normal TMs


Nose: Normal Inspection


Throat/Mouth: Normal Inspection


Head: Atraumatic


Neck: Normal Inspection


Respiratory/Chest: No Respiratory Distress, No Accessory Muscle Use, Chest Non-

Tender, Decreased Breath Sounds, Wheezing (He does have some inspiratory and 

expiratory wheezing bilaterally.  Some audible wheezing as well.).  No: 

Crackles, Rales, Rhonchi


Cardiovascular: Normal Peripheral Pulses, Regular Rate, Rhythm


GI/Abdominal Exam: No Abnormal Bruit, Distended, Guarding (Throughout his abd 

with ? peritoneal signs. With even the lightest palpation he has severe pain. 

Tympanic percussion throughout the abd. ), Tender (Throughout. ).  No: Rebound, 

Hernia


 (Male) Exam: Deferred


Rectal (Males) Exam: Deferred


Back Exam: Normal Inspection, Full Range of Motion


Extremities: Normal Inspection, Normal Range of Motion, Normal Capillary Refill


Neurological: Alert, Oriented (To his baseline per the wife. )


Skin Exam: Intact, Cool, Diaphoretic, Pallor





Course





- Vital Signs


Last Recorded V/S: 


                                Last Vital Signs











Temp  97.1 F   12/25/20 03:10


 


Pulse  91   12/25/20 06:07


 


Resp  16   12/25/20 06:07


 


BP  104/56 L  12/25/20 06:07


 


Pulse Ox  94 L  12/25/20 06:07














- Orders/Labs/Meds


Orders: 


                               Active Orders 24 hr











 Category Date Time Status


 


 RT Aerosol Therapy [RC] ASDIRECTED Care  12/25/20 03:36 Active


 


 Abdomen Pelvis w Cont [CT] Stat Exams  12/25/20 03:23 Taken


 


 Chest 1V Frontal [CR] Stat Exams  12/25/20 03:23 Taken


 


 CULTURE BLOOD [BC] Stat Lab  12/25/20 04:07 Results


 


 CULTURE BLOOD [BC] Stat Lab  12/25/20 04:12 Results


 


 UA RFX ERIKA AND CULT IF INDIC [URIN] Stat Lab  12/25/20 03:23 Ordered


 


 Lactated Ringers [Ringers, Lactated] 1,000 ml Med  12/25/20 03:30 Active





 IV ASDIRECTED   


 


 Lactated Ringers [Ringers, Lactated] 1,000 ml Med  12/25/20 05:57 Ordered





 IV ONETIME   


 


 Sodium Chloride 0.9% [Saline Flush] Med  12/25/20 03:25 Active





 10 ml FLUSH ASDIRECTED PRN   


 


 Blood Culture x2 Reflex Set [OM.PC] Stat Oth  12/25/20 03:52 Ordered


 


 Peripheral IV Insertion Adult [OM.PC] Stat Oth  12/25/20 03:23 Ordered








                                Medication Orders





Lactated Ringer's (Ringers, Lactated)  1,000 mls @ 125 mls/hr IV ASDIRECTED ELIZABETH


   Last Admin: 12/25/20 03:35  Dose: 125 mls/hr


   Documented by: SHAZIA


Lactated Ringer's (Ringers, Lactated)  1,000 mls @ 999 mls/hr IV ONETIME ONE


   Stop: 12/25/20 06:57


   Last Admin: 12/25/20 06:06  Dose: 999 mls/hr


   Documented by: SHAZIA


Sodium Chloride (Saline Flush)  10 ml FLUSH ASDIRECTED PRN


   PRN Reason: Keep Vein Open








Labs: 


                                Laboratory Tests











  12/25/20 12/25/20 12/25/20 Range/Units





  03:38 03:38 03:38 


 


WBC  23.1 H*    (4.0-10.0)  x10^3/uL


 


RBC  5.59    (4.5-6.0)  x10^6/uL


 


Hgb  17.0  D    (14.0-18.0)  g/dL


 


Hct  49.0    (40.0-52.0)  %


 


MCV  87.7    (78.0-93.0)  fL


 


MCH  30.4    (26.0-32.0)  pg


 


MCHC  34.7    (32.0-36.0)  g/dL


 


RDW Coeff of Michele  14.2    (10.0-15.0)  %


 


Plt Count  161    (130-400)  x10^3/uL


 


Neut % (Auto)  88.9 H    (50.0-80.0)  %


 


Lymph % (Auto)  4.4 L    (25.0-50.0)  %


 


Mono % (Auto)  6.6    (2.0-11.0)  %


 


Eos % (Auto)  0.0    (0.0-4.0)  %


 


Baso % (Auto)  0.1 L    (0.2-1.2)  %


 


Sodium   139   (136-145)  mmol/L


 


Potassium   4.0   (3.5-5.1)  mmol/L


 


Chloride   100   ()  mmol/L


 


Carbon Dioxide   28   (21-32)  mmol/L


 


Anion Gap   15.0   (10-20)  mmol/L


 


BUN   23 H   (7-18)  mg/dL


 


Creatinine   1.3   (0.70-1.30)  mg/dL


 


Est Cr Clr Drug Dosing   54.71   mL/min


 


Estimated GFR (MDRD)   54   


 


Glucose   152 H   ()  mg/dL


 


Lactic Acid    2.4 H*  (0.4-2.0)  mmol/L


 


Calcium   9.2   (8.5-10.1)  mg/dL


 


Corrected Calcium   9.76   (8.5-10.1)  mg/dL


 


Total Bilirubin   1.1 H   (0.2-1.0)  mg/dL


 


AST   14 L   (15-37)  U/L


 


ALT   18   (16-63)  U/L


 


Alkaline Phosphatase   79   ()  U/L


 


C-Reactive Protein   0.7   (<=0.9)  mg/dL


 


Total Protein   7.3   (6.4-8.2)  g/dL


 


Albumin   3.3 L   (3.4-5.0)  g/dL


 


Globulin   4.0   


 


Albumin/Globulin Ratio   0.83   


 


Lipase   868 H   ()  U/L











Meds: 


Medications











Generic Name Dose Route Start Last Admin





  Trade Name Freq  PRN Reason Stop Dose Admin


 


Lactated Ringer's  1,000 mls @ 125 mls/hr  12/25/20 03:30  12/25/20 03:35





  Ringers, Lactated  IV   125 mls/hr





  ASDIRECTED ELIZABETH   Administration


 


Lactated Ringer's  1,000 mls @ 999 mls/hr  12/25/20 05:57  12/25/20 06:06





  Ringers, Lactated  IV  12/25/20 06:57  999 mls/hr





  ONETIME ONE   Administration


 


Sodium Chloride  10 ml  12/25/20 03:25 





  Saline Flush  FLUSH  





  ASDIRECTED PRN  





  Keep Vein Open  














Discontinued Medications














Generic Name Dose Route Start Last Admin





  Trade Name Freq  PRN Reason Stop Dose Admin


 


Albuterol/Ipratropium  3 ml  12/25/20 03:36  12/25/20 03:49





  Duoneb 3.0-0.5 Mg/3 Ml  NEB  12/25/20 03:37  3 ml





  ONETIME ONE   Administration


 


Hydromorphone HCl  0.5 mg  12/25/20 03:29  12/25/20 03:35





  Dilaudid  IV  12/25/20 03:30  0.5 mg





  ONETIME ONE   Administration


 


Hydromorphone HCl  0.5 mg  12/25/20 04:40  12/25/20 04:45





  Dilaudid  IV  12/25/20 04:41  0.5 mg





  ONETIME ONE   Administration


 


Piperacillin Sod/Tazobactam  100 mls @ 200 mls/hr  12/25/20 05:25  12/25/20 

05:30





  Sod 3.375 gm/ Sodium Chloride  IV  12/25/20 05:54  200 mls/hr





  STAT ONE   Administration


 


Iopamidol  100 ml  12/25/20 03:42  12/25/20 04:59





  Isovue-300 (61%)  IVPUSH  12/25/20 03:43  100 ml





  ONETIME ONE   Administration


 


Ondansetron HCl  4 mg  12/25/20 03:25  12/25/20 03:35





  Zofran  IV  12/25/20 03:26  4 mg





  ONETIME ONE   Administration














- Radiology Interpretation


Free Text/Narrative:: 





Chest x-ray per radiology shows no acute pathology.





CT abdomen pelvis per radiology with IV contrast shows right mild basilar 

atelectasis.  Moderate volume free peritoneal air.  Small volume perihepatic 

ascites.  No identification of the site of perforation.  Also 3.1 upper abdomen 

aortic aneurysm.





- Re-Assessments/Exams


Free Text/Narrative Re-Assessment/Exam: 





12/25/20





DuoNeb with resolution of the patient's wheezing and shortness of breath.





IV was established labs are drawn to include blood cultures x2.





Dilaudid for IV pain management.





 mill bolus and 125 mils an hour.





CBC with a quite elevated white blood cell count of 23.1.  With a hemoglobin of 

17.





Lactic acid 2.4.





Zosyn 3.375 g IV piggyback.





CT of the abdomen pelvis concerning with a moderate volume free peritoneal air. 

 Unknown cause of perforation.





The patient did start to drop his blood pressure into the mid 80s systolically 

following a couple doses of Dilaudid.  He improved his blood pressure after 

about a liter bolus of fluid to a blood pressure 128/73.





[facility] as [facility] 

[facility] and they do not feel with his comorbidities to include his 

COPD Lewy body dementia his aortic stenosis as well as his a ascending aneurysm 

that was recently diagnosed that he should be sent to [facility]. 





I called and spoke with Dr. Rosa at [facility] in [facility] [place]. HPI ER COURSE findings 

and concerns were relayed to him. His questions were answered. No new orders 

given. 





I discussed the plan of care and the critical findings on the patients CT scan. 

Their questions were answered and they are comfortable with the plan. 








Departure





- Departure


Time of Disposition: 06:21


Disposition: DC/Tfer to Acute Hospital 02


Clinical Impression: 


 Intra-abdominal free air of unknown etiology








- Discharge Information


Referrals: 


PCP,None [Primary Care Provider] - 


Forms:  ED Department Discharge, Interfacility Transfer HARRY





Sepsis Event Note (ED)





- Evaluation


Sepsis Screening Result: No Definite Risk





- Focused Exam


Vital Signs: 


                                   Vital Signs











  Temp Pulse Resp BP Pulse Ox


 


 12/25/20 06:07   91  16  104/56 L  94 L


 


 12/25/20 05:55   88   84/50 L 


 


 12/25/20 05:38   92  16  86/50 L  94 L


 


 12/25/20 05:00   89  16  112/62  94 L


 


 12/25/20 04:25   86  16  132/62  93 L


 


 12/25/20 03:10  97.1 F  67  24 H  138/72  94 L














- My Orders


Last 24 Hours: 


My Active Orders





12/25/20 03:23


Abdomen Pelvis w Cont [CT] Stat 


Chest 1V Frontal [CR] Stat 


UA RFX ERIKA AND CULT IF INDIC [URIN] Stat 


Peripheral IV Insertion Adult [OM.PC] Stat 





12/25/20 03:25


Sodium Chloride 0.9% [Saline Flush]   10 ml FLUSH ASDIRECTED PRN 





12/25/20 03:30


Lactated Ringers [Ringers, Lactated] 1,000 ml IV ASDIRECTED 





12/25/20 03:36


RT Aerosol Therapy [RC] ASDIRECTED 





12/25/20 03:52


Blood Culture x2 Reflex Set [OM.PC] Stat 





12/25/20 04:07


CULTURE BLOOD [BC] Stat 





12/25/20 04:12


CULTURE BLOOD [BC] Stat 





12/25/20 05:57


Lactated Ringers [Ringers, Lactated] 1,000 ml IV ONETIME 














- Assessment/Plan


Last 24 Hours: 


My Active Orders





12/25/20 03:23


Abdomen Pelvis w Cont [CT] Stat 


Chest 1V Frontal [CR] Stat 


UA RFX ERIKA AND CULT IF INDIC [URIN] Stat 


Peripheral IV Insertion Adult [OM.PC] Stat 





12/25/20 03:25


Sodium Chloride 0.9% [Saline Flush]   10 ml FLUSH ASDIRECTED PRN 





12/25/20 03:30


Lactated Ringers [Ringers, Lactated] 1,000 ml IV ASDIRECTED 





12/25/20 03:36


RT Aerosol Therapy [RC] ASDIRECTED 





12/25/20 03:52


Blood Culture x2 Reflex Set [OM.PC] Stat 





12/25/20 04:07


CULTURE BLOOD [BC] Stat 





12/25/20 04:12


CULTURE BLOOD [BC] Stat 





12/25/20 05:57


Lactated Ringers [Ringers, Lactated] 1,000 ml IV ONETIME

## 2020-12-25 NOTE — CR
______________________________________________________________________________   

  

[id number] RAD/RAD Chest PA or AP 1V  

EXAM:  RAD Chest PA or AP 1V  

   

 INDICATION:  ABDOMEN PAIN, DISTENSION, ?BOWEL OBSTRUCTION  

   

 COMPARISON:  12/10/2020.  

   

 DISCUSSION:    

   

 Cardiomediastinal silhouette is stable in size and contour.  

   

 No infiltrate, effusion, pneumothorax, or edema.  

   

 IMPRESSION:  

 No acute cardiopulmonary abnormality.  

   

 Electronically signed by Papito Estrada MD on 12/25/2020 9:36 AM  

   

  

Papito Estrada DO                 

 12/25/20 0939    

  

Thank you for allowing us to participate in the care of your patient.

## 2020-12-25 NOTE — HP
CHIEF COMPLAINT:  Perforated abdominal viscus.

 

HISTORY OF PRESENT ILLNESS:  This is a 70-year-old male who had been recovering

at [facility] since 12/21 after a 12/11 through 12/21 admit for COPD

exacerbation from a COVID-19 infection.  The patient received Solu-Medrol,

remdesivir, nebs, vitamins, ceftriaxone during his stay.  He had a known

ascending aortic aneurysm and moderate-to-severe aortic stenosis by CT and was

recommended for some outpatient followup.  The patient's overall health has been

complicated by Lewy body dementia, but he was at home with his wife until his

COVID illness.  While recovering at the nursing home, he had acute onset of

abdominal pain around 2:30 a.m. last night.  He was brought over to the

emergency room and evaluated and found to have free air.  Given his overall

health and recent COVID-19 and reported non ST-elevation MI earlier this month,

decision was made to not pursue surgery.  The patient's wife said he has not

wanted any surgeries and he was initiated on comfort medications with Versed 6

mg every 2 hours and Dilaudid along with that.  When the medicines wear off, he

has been writhing in pain per his family.  They were unable to put in a Plasencia

prior to transfer, but he does have a midline IV.  When I saw him, he had

already received the Versed and Dilaudid by nursing, which I had okayed.  He was

resting comfortably in bed.  He was not on any oxygen.  His abdomen was

distended.  He did not even grimace when I palpated, but I did not do any deep

palpation.  His wife and grandson were at the bedside.  His daughter who is a

nurse did come up and also visited later.

 

ALLERGIES:  The patient's allergies include gemfibrozil and morphine, known, but

he is tolerating Dilaudid.

 

MEDICATIONS:  His current medication list from [place] included the Dilaudid 1

mg every 2 hours as needed for severe pain, Zofran 4 mg every 4 hours as needed

for nausea, Versed 2 mg every 2 hours as needed for agitation, Robinul 0.5 to 1

 every 4 hours as needed for secretions, Protonix 40 mg IV, prednisone

40 mg daily for 5 days, vitamin C, vitamin D, lopressor, Aricept, multivitamin,

Flomax, Crestor, Sinemet, Tylenol, albuterol, Symbicort, Proscar, Flonase, and

hydrochlorothiazide.  However, the patient likely will not be able to take any

oral medications.

 

PAST MEDICAL HISTORY:  [facility]  He has had chronic back pain due to degenerative disk disease of lumbar

spine.  He has had COPD.  He has Lewy body dementia.  He has obesity,

dyslipidemia, essential hypertension, BPH.

 

PAST SURGICAL HISTORY:  Epidural steroid injections.

 

FAMILY HISTORY:  Not listed, not obtained.

 

SOCIAL HISTORY:  The patient is .  He lives at home with his wife.

 

REVIEW OF SYSTEMS:

Unobtainable due to patient's condition.

 

PHYSICAL EXAMINATION:

VITAL SIGNS:  No vitals yet recorded other than O2 was 89%.

HEART:  Regular rate and rhythm.  S1, S2 with murmur appreciated.

LUNGS:  Lung sounds were clear with poor respiratory effort.  No crackles.  No

wheezes.

ABDOMEN:  Distended.  No bowel sounds.  No extreme tenderness, but again I did

not do forceful palpation on exam.

EXTREMITIES:  Cool, even his arms.  His left forearm had a bleeding site from an

IV removal.

NEUROLOGIC:  He is sedated.  He did not open his eyes to verbal stimuli.  I did

not do painful stimuli, other than during his abdominal exam.

SKIN:  He had extensive bruising across his abdomen from previous heparin or

Lovenox injections on his acute stay.

 

Otherwise, most of the history was obtained from the wife who was at the

bedside.  We did discuss oxygen at this point, we left it off as he is not

gasping for air and it may prolong things.

 

ASSESSMENT AND PLAN:

1. Perforated abdominal viscus.  It is not clear which area; however, the

    patient did not want to pursue surgery.  Family did not want to pursue

    surgery.  Therefore, he was transferred back to [place] for further end

    of life cares.

2. COVID-19 infection, admitted from 12/11 through 12/21, resolved.  He is not

    having respiratory symptoms.  He is not on any medications for that.

3. Lewy body dementia.  Currently, I held the Sinemet.  If he becomes awake

    and alert and wants to take meds, we will reorder it.

4. Essential hypertension.  Blood pressure medications are on hold.  We will

    do vitals p.r.n.

5. Chronic obstructive pulmonary disease.  I do not think he will be able to

    cooperate with an inhaler.

6. Thoracic aortic aneurysm and aortic stenosis, which do not make him a good

    surgical candidate.

7. Obesity.

8. Benign prostatic hypertrophy.  We will do bladder scans if needed.  We will

    not attempt to place a Plasencia unless he is having retention.

 

The patient will be admitted for skilled end-of-life cares.  I will schedule

Dilaudid 1 mg q.12 hours and to continue 1 mg q.2 hours p.r.n.  Due to

availability of our Versed supply, we have decided to do a Versed 2 mg/hour

drip.  We can titrate that up if needed or turn it off if he becomes overly

sedated.  He will have Robinul and atropine for secretions.  He will have Zofran

and Compazine for nausea.  Turn and reposition as needed.  Tylenol will be

available rectal.  Skin cares with Calmoseptine.

 

The patient is being admitted for compassionate and end-of-life cares.  We will

allow family visitation.

 

For DVT prophylaxis, he does not need any medications.

 

 

Dr. Caro is following the patient at the nursing home.

 

 

MKA:  12/25/2020 20:26:04  MODL:  12/25/2020 22:13:13

Job #:  [id number]

SKIP

## 2020-12-26 RX ADMIN — SODIUM CHLORIDE PRN MG: 9 INJECTION, SOLUTION INTRAVENOUS at 01:33

## 2020-12-26 NOTE — [id number]
**Discharge Summary





- Hospital Course


Free Text/Narrative:: 





Patent admitted for end of life care for a bowel perforation and was managed 

with scheduled and prn dilaudid and a versed drip.  He passed away during the 

first night of his admission.  His passing was anticipated to be within hours to

days.  See detailed H and P completed.  





- Discharge Data


Discharge Date: 20


Discharge Disposition:  20


Condition: 





- Referral to Home Health


Primary Care Physician: 


PCP None








- Discharge Diagnosis/Problem(s)


(1) Perforated bowel


SNOMED Code(s): 18805689


   ICD Code: K63.1 - PERFORATION OF INTESTINE (NONTRAUMATIC)   Status: Acute   

Priority: High   





(2) COPD (chronic obstructive pulmonary disease)


SNOMED Code(s): 52400169


   ICD Code: J44.9 - CHRONIC OBSTRUCTIVE PULMONARY DISEASE, UNSPECIFIED   

Status: Chronic   Priority: Medium   


Qualifiers: 


   Chronic bronchitis type: unspecified 





(3) COVID-19


SNOMED Code(s): 243141713


   ICD Code: U07.1 - COVID-19   Status: Resolved   





(4) Intra-abdominal free air of unknown etiology


SNOMED Code(s): 26322730


   ICD Code: K66.8 - OTHER SPECIFIED DISORDERS OF PERITONEUM   Status: Acute   

Priority: High   





(5) Lewy body dementia


SNOMED Code(s): 394216370


   ICD Code: G31.83 - DEMENTIA WITH LEWY BODIES; F02.80 - DEMENTIA IN OTH 

DISEASES CLASSD ELSWHR W/O BEHAVRL DISTURB   Status: Chronic   Priority: High   


Qualifiers: 


   Dementia behavioral disturbance: without behavioral disturbance   Qualified 

Code(s): G31.83 - Dementia with Lewy bodies; F02.80 - Dementia in other diseases

classified elsewhere without behavioral disturbance   





- Discharge Plan


Home Medications: 


                                    Home Meds





Acetaminophen [Tylenol] 650 mg PO TID 09/15/20 [History]


Albuterol Sulfate [Proair Digihaler] 2 puff IH QID 09/15/20 [History]


Budesonide/Formoterol Fumarate [Budesonide-Formoterol 160-4.5] 2 puff IH BID 

09/15/20 [History]


Donepezil HCl 5 mg PO BEDTIME 09/15/20 [History]


Finasteride 5 mg PO DAILY 09/15/20 [History]


Metoprolol Tartrate [Lopressor] 100 mg PO BID 09/15/20 [History]


Multivitamin [Multivitamins] 1 cap PO DAILY 09/15/20 [History]


Tamsulosin [Flomax] 0.4 mg PO DAILY 09/15/20 [History]


hydroCHLOROthiazide [Hydrochlorothiazide] 25 mg PO DAILY 09/15/20 [History]


Ascorbic Acid [C-1000] 1,000 mg PO BID 20 [History]


Carbidopa/Levodopa [Carbidopa-Levodopa  Tab] 1 each PO TID 20 

[History]


Cholecalciferol (Vitamin D3) [Cholecalciferol] 1,000 unit PO DAILY 20 

[History]


Fluticasone Propionate [Flonase] 1 spray NASBOTH DAILY 20 [History]


Glycopyrrolate [Robinul] 0.1 - 0.2 mg IVPUSH Q4HR PRN 20 [History]


HYDROmorphone [Dilaudid] 1 mg IVPUSH Q2HR PRN 20 [History]


Midazolam [Versed 1 MG/ML] 5 - 15 mg IVPUSH Q2HR PRN 20 [History]


Ondansetron [Zofran] 4 mg IVPUSH Q4HR PRN 20 [History]


Pantoprazole [ProTONIX IV***] 40 mg IVPUSH DAILY 20 [History]


QUEtiapine [SEROquel] 12.5 mg PO BID PRN 20 [History]


Rosuvastatin [Crestor] 10 mg PO DAILY 20 [History]


predniSONE [Prednisone] 40 mg PO DAILY 20 [History]











- Discharge Summary/Plan Comment


DC Time >30 min.: No





- Patient Data


Vitals - Most Recent: 


                                Last Vital Signs











Temp  97.2 F   20 18:27


 


Pulse  103 H  20 18:27


 


Resp      


 


BP  134/95 H  20 18:27


 


Pulse Ox  89 L  20 18:27











Med Orders - Current: 


                               Current Medications








Discontinued Medications





Acetaminophen (Tylenol)  650 mg RECTAL Q3H PRN


   PRN Reason: Pain/Fever


Atropine Sulfate (Atropine 1% Ophth Soln)  0 ml SL Q2H PRN


   PRN Reason: Copius Secretions


Calamine/Phenol (Calmoseptine)  0 gm TOP Q2H PRN


   PRN Reason: Irritation


Glycopyrrolate (Glycopyrrolate)  0.1 - 0.2 mg IVPUSH Q4H PRN


   PRN Reason: Other


Hydromorphone HCl (Dilaudid)  1 mg IVPUSH Q2H PRN


   PRN Reason: Pain


   Last Admin: 20 01:33 Dose:  1 mg


   Documented by: 


Hydromorphone HCl (Dilaudid)  1 mg IVPUSH Q12H ELIZABETH


   Last Admin: 20 20:15 Dose:  1 mg


   Documented by: 


Midazolam HCl 50 mg/ Sodium (Chloride)  110 mls @ 4.4 mls/hr IV TITRATE ELIZABETH


   Last Admin: 20 20:28 Dose:  2 mg/hr, 4.4 mls/hr


   Documented by: 


Prochlorperazine Edisylate 10 (mg/ Sodium Chloride)  52 mls @ 150 mls/hr IV Q6H 

PRN


   PRN Reason: Nausea/Vomiting


Lidocaine HCl (Xylocaine 2% Viscous)  5 ml PO Q4H PRN


   PRN Reason: Pain


Midazolam HCl (Versed 1 Mg/Ml)  0 mg IVPUSH Q2H PRN


   PRN Reason: Anxiety


   Last Admin: 20 18:46 Dose:  2 mg


   Documented by: 


Ondansetron HCl (Zofran)  4 mg IVPUSH Q4H PRN


   PRN Reason: Nausea


   Last Admin: 20 01:33 Dose:  4 mg


   Documented by: 


Sodium Chloride (Saline Flush)  10 ml FLUSH ASDIRECTED PRN


   PRN Reason: flush


   Last Admin: 20 20:29 Dose:  10 ml


   Documented by: